# Patient Record
Sex: FEMALE
[De-identification: names, ages, dates, MRNs, and addresses within clinical notes are randomized per-mention and may not be internally consistent; named-entity substitution may affect disease eponyms.]

---

## 2017-07-19 ENCOUNTER — RESULT REVIEW (OUTPATIENT)
Age: 37
End: 2017-07-19

## 2018-03-15 ENCOUNTER — APPOINTMENT (OUTPATIENT)
Dept: OBGYN | Facility: CLINIC | Age: 38
End: 2018-03-15
Payer: COMMERCIAL

## 2018-03-15 VITALS
DIASTOLIC BLOOD PRESSURE: 71 MMHG | HEIGHT: 63 IN | SYSTOLIC BLOOD PRESSURE: 107 MMHG | BODY MASS INDEX: 22.15 KG/M2 | WEIGHT: 125 LBS

## 2018-03-15 DIAGNOSIS — Z34.90 ENCOUNTER FOR SUPERVISION OF NORMAL PREGNANCY, UNSPECIFIED, UNSPECIFIED TRIMESTER: ICD-10-CM

## 2018-03-15 PROCEDURE — 99385 PREV VISIT NEW AGE 18-39: CPT

## 2018-03-15 RX ORDER — AMOXICILLIN AND CLAVULANATE POTASSIUM 875; 125 MG/1; MG/1
875-125 TABLET, COATED ORAL
Qty: 20 | Refills: 0 | Status: COMPLETED | COMMUNITY
Start: 2018-02-28

## 2018-03-15 RX ORDER — HYDROCODONE BITARTRATE AND ACETAMINOPHEN 5; 325 MG/1; MG/1
5-325 TABLET ORAL
Qty: 10 | Refills: 0 | Status: COMPLETED | COMMUNITY
Start: 2018-03-09

## 2018-03-15 RX ORDER — TOBRAMYCIN AND DEXAMETHASONE 3; 1 MG/ML; MG/ML
0.3-0.1 SUSPENSION/ DROPS OPHTHALMIC
Qty: 5 | Refills: 0 | Status: COMPLETED | COMMUNITY
Start: 2017-11-13

## 2018-03-16 LAB — HPV HIGH+LOW RISK DNA PNL CVX: NOT DETECTED

## 2018-03-20 LAB — CYTOLOGY CVX/VAG DOC THIN PREP: NORMAL

## 2018-11-01 ENCOUNTER — APPOINTMENT (OUTPATIENT)
Dept: OBGYN | Facility: CLINIC | Age: 38
End: 2018-11-01
Payer: COMMERCIAL

## 2018-11-01 VITALS
DIASTOLIC BLOOD PRESSURE: 72 MMHG | BODY MASS INDEX: 22.15 KG/M2 | WEIGHT: 125 LBS | HEIGHT: 63 IN | SYSTOLIC BLOOD PRESSURE: 110 MMHG

## 2018-11-01 DIAGNOSIS — N76.4 ABSCESS OF VULVA: ICD-10-CM

## 2018-11-01 PROCEDURE — 99214 OFFICE O/P EST MOD 30 MIN: CPT

## 2019-02-07 ENCOUNTER — APPOINTMENT (OUTPATIENT)
Dept: OBGYN | Facility: CLINIC | Age: 39
End: 2019-02-07
Payer: COMMERCIAL

## 2019-02-07 ENCOUNTER — ASOB RESULT (OUTPATIENT)
Age: 39
End: 2019-02-07

## 2019-02-07 VITALS
HEIGHT: 63 IN | SYSTOLIC BLOOD PRESSURE: 125 MMHG | BODY MASS INDEX: 22.15 KG/M2 | WEIGHT: 125 LBS | DIASTOLIC BLOOD PRESSURE: 84 MMHG

## 2019-02-07 DIAGNOSIS — R14.0 ABDOMINAL DISTENSION (GASEOUS): ICD-10-CM

## 2019-02-07 PROCEDURE — 99214 OFFICE O/P EST MOD 30 MIN: CPT

## 2019-02-07 PROCEDURE — 76830 TRANSVAGINAL US NON-OB: CPT

## 2019-02-07 RX ORDER — CIPROFLOXACIN HYDROCHLORIDE 500 MG/1
500 TABLET, FILM COATED ORAL
Qty: 6 | Refills: 0 | Status: COMPLETED | COMMUNITY
Start: 2018-11-01 | End: 2019-02-07

## 2019-02-07 RX ORDER — AZITHROMYCIN 500 MG/1
500 TABLET, FILM COATED ORAL
Qty: 5 | Refills: 0 | Status: COMPLETED | COMMUNITY
Start: 2019-01-05

## 2019-05-07 ENCOUNTER — APPOINTMENT (OUTPATIENT)
Dept: FAMILY MEDICINE | Facility: CLINIC | Age: 39
End: 2019-05-07
Payer: COMMERCIAL

## 2019-05-07 VITALS
RESPIRATION RATE: 16 BRPM | HEART RATE: 90 BPM | OXYGEN SATURATION: 98 % | HEIGHT: 63 IN | SYSTOLIC BLOOD PRESSURE: 125 MMHG | DIASTOLIC BLOOD PRESSURE: 80 MMHG | WEIGHT: 125 LBS | BODY MASS INDEX: 22.15 KG/M2

## 2019-05-07 DIAGNOSIS — Z85.3 PERSONAL HISTORY OF MALIGNANT NEOPLASM OF BREAST: ICD-10-CM

## 2019-05-07 DIAGNOSIS — Z82.49 FAMILY HISTORY OF ISCHEMIC HEART DISEASE AND OTHER DISEASES OF THE CIRCULATORY SYSTEM: ICD-10-CM

## 2019-05-07 PROCEDURE — 99204 OFFICE O/P NEW MOD 45 MIN: CPT | Mod: 25

## 2019-05-07 PROCEDURE — 36415 COLL VENOUS BLD VENIPUNCTURE: CPT

## 2019-05-07 RX ORDER — ALPRAZOLAM 0.25 MG/1
0.25 TABLET ORAL
Qty: 6 | Refills: 0 | Status: DISCONTINUED | COMMUNITY
Start: 2018-03-01 | End: 2019-05-07

## 2019-05-07 RX ORDER — CIPROFLOXACIN HYDROCHLORIDE 500 MG/1
500 TABLET, FILM COATED ORAL
Qty: 14 | Refills: 0 | Status: DISCONTINUED | COMMUNITY
Start: 2018-05-16 | End: 2019-05-07

## 2019-05-07 NOTE — ASSESSMENT
[FreeTextEntry1] : THYROID\par Patient had a diagnosis of thyroid disorder. Blood was drawn to assess levels of TSH and T4. Patient will continue on current dose of medications at this time unless instructed otherwise. Patient was advised to take thyroid medications on a empty stomach and to wait at least 45 minutes before eating for appropriate absorption.\par \par S/P BREAST CANCER WITH RADIATION/ONC\par patient not feeling right s/p treatment - very tired - not feeling like herself\par \par going to nutritionist wishes to have panel of bw done\par ordered as per RX\par

## 2019-05-07 NOTE — HEALTH RISK ASSESSMENT
[Good] : ~his/her~  mood as  good [] : No [No falls in past year] : Patient reported no falls in the past year [0] : 2) Feeling down, depressed, or hopeless: Not at all (0) [[id number]] : 0 [HIV Test offered] : HIV Test offered [Patient reported mammogram was normal] : Patient reported mammogram was normal [With Significant Other] : lives with significant other [Hepatitis C test offered] : Hepatitis C test offered [Employed] : employed [] :  [# Of Children ___] : has [unfilled] children [Feels Safe at Home] : Feels safe at home [Fully functional (bathing, dressing, toileting, transferring, walking, feeding)] : Fully functional (bathing, dressing, toileting, transferring, walking, feeding) [Fully functional (using the telephone, shopping, preparing meals, housekeeping, doing laundry, using] : Fully functional and needs no help or supervision to perform IADLs (using the telephone, shopping, preparing meals, housekeeping, doing laundry, using transportation, managing medications and managing finances) [Seat Belt] : does not use seat belt [Smoke Detector] : smoke detector [MammogramComments] : breast cancer 2018 [MammogramDate] : 2/2019 [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

## 2019-05-07 NOTE — HISTORY OF PRESENT ILLNESS
[de-identified] : 38 year old female here to establish care. The patients complete medical, family and social history was documented and reviewed with the patient.  The patients medications were identified and also reviewed with the patient as well as any allergies to any medications. All active and previous medical problems were identified and discussed with the patient.  Any new problems were documented. Patient is feeling well today.\par \par Had breast cancer last year, finished radiation and chemo in october 2018 - and is not feeling right\par going to see nutritionist and needs bw

## 2019-05-07 NOTE — PHYSICAL EXAM
[Well Nourished] : well nourished [Regular Rhythm] : with a regular rhythm [Clear to Auscultation] : lungs were clear to auscultation bilaterally [Normal S1, S2] : normal S1 and S2 [Normal Gait] : normal gait [Normal Affect] : the affect was normal [Normal Insight/Judgement] : insight and judgment were intact

## 2019-05-09 ENCOUNTER — MOBILE ON CALL (OUTPATIENT)
Age: 39
End: 2019-05-09

## 2019-10-16 ENCOUNTER — APPOINTMENT (OUTPATIENT)
Dept: FAMILY MEDICINE | Facility: CLINIC | Age: 39
End: 2019-10-16
Payer: COMMERCIAL

## 2019-10-16 VITALS
SYSTOLIC BLOOD PRESSURE: 110 MMHG | HEIGHT: 64 IN | DIASTOLIC BLOOD PRESSURE: 70 MMHG | HEART RATE: 86 BPM | BODY MASS INDEX: 21.51 KG/M2 | WEIGHT: 126 LBS | TEMPERATURE: 98.2 F | OXYGEN SATURATION: 99 %

## 2019-10-16 PROCEDURE — 99213 OFFICE O/P EST LOW 20 MIN: CPT | Mod: 25

## 2019-10-16 PROCEDURE — 36415 COLL VENOUS BLD VENIPUNCTURE: CPT

## 2019-10-16 NOTE — PHYSICAL EXAM
[Normal Sclera/Conjunctiva] : normal sclera/conjunctiva [Normal Oropharynx] : the oropharynx was normal [No Lymphadenopathy] : no lymphadenopathy [Supple] : supple [Thyroid Normal, No Nodules] : the thyroid was normal and there were no nodules present [Normal] : affect was normal and insight and judgment were intact

## 2019-10-16 NOTE — HISTORY OF PRESENT ILLNESS
[FreeTextEntry1] : Here for follow-up of thyroid levels.  [de-identified] : Here for follow-up of thyroid levels. \par Saw oncologist last week. Had CBC and CMP performed, within normal limits.\par \par Needs thyroid medication refill. Synthroid 112 mcg. Brand-name.  Has been on same medication dose for years. \par September back to work, went back to work after being off for 1 year.   \par Saw GI-had colonoscopy. \par \par Medications and allergies reviewed.\par

## 2019-12-04 ENCOUNTER — APPOINTMENT (OUTPATIENT)
Dept: OBGYN | Facility: CLINIC | Age: 39
End: 2019-12-04
Payer: COMMERCIAL

## 2019-12-04 VITALS
HEIGHT: 64 IN | WEIGHT: 125 LBS | BODY MASS INDEX: 21.34 KG/M2 | DIASTOLIC BLOOD PRESSURE: 77 MMHG | SYSTOLIC BLOOD PRESSURE: 115 MMHG

## 2019-12-04 DIAGNOSIS — Z01.419 ENCOUNTER FOR GYNECOLOGICAL EXAMINATION (GENERAL) (ROUTINE) W/OUT ABNORMAL FINDINGS: ICD-10-CM

## 2019-12-04 DIAGNOSIS — R10.2 PELVIC AND PERINEAL PAIN: ICD-10-CM

## 2019-12-04 DIAGNOSIS — N83.299 OTHER OVARIAN CYST, UNSPECIFIED SIDE: ICD-10-CM

## 2019-12-04 PROCEDURE — 99213 OFFICE O/P EST LOW 20 MIN: CPT | Mod: 25

## 2019-12-04 PROCEDURE — 99396 PREV VISIT EST AGE 40-64: CPT

## 2019-12-05 LAB — HPV HIGH+LOW RISK DNA PNL CVX: NOT DETECTED

## 2019-12-09 LAB — CYTOLOGY CVX/VAG DOC THIN PREP: NORMAL

## 2019-12-19 ENCOUNTER — ASOB RESULT (OUTPATIENT)
Age: 39
End: 2019-12-19

## 2019-12-19 ENCOUNTER — APPOINTMENT (OUTPATIENT)
Dept: OBGYN | Facility: CLINIC | Age: 39
End: 2019-12-19
Payer: COMMERCIAL

## 2019-12-19 PROCEDURE — 76830 TRANSVAGINAL US NON-OB: CPT

## 2019-12-20 ENCOUNTER — MESSAGE (OUTPATIENT)
Age: 39
End: 2019-12-20

## 2019-12-25 PROBLEM — R10.2 PELVIC PAIN IN FEMALE: Status: ACTIVE | Noted: 2019-02-07

## 2020-07-14 ENCOUNTER — APPOINTMENT (OUTPATIENT)
Dept: FAMILY MEDICINE | Facility: CLINIC | Age: 40
End: 2020-07-14
Payer: COMMERCIAL

## 2020-07-14 VITALS
WEIGHT: 128 LBS | HEART RATE: 70 BPM | DIASTOLIC BLOOD PRESSURE: 63 MMHG | SYSTOLIC BLOOD PRESSURE: 119 MMHG | RESPIRATION RATE: 16 BRPM | OXYGEN SATURATION: 99 % | BODY MASS INDEX: 21.85 KG/M2 | HEIGHT: 64 IN

## 2020-07-14 DIAGNOSIS — Z00.00 ENCOUNTER FOR GENERAL ADULT MEDICAL EXAMINATION W/OUT ABNORMAL FINDINGS: ICD-10-CM

## 2020-07-14 PROCEDURE — 99396 PREV VISIT EST AGE 40-64: CPT | Mod: 25

## 2020-07-14 PROCEDURE — 36415 COLL VENOUS BLD VENIPUNCTURE: CPT

## 2020-07-14 NOTE — ASSESSMENT
[FreeTextEntry1] : Patient was counseled on healthy eating habits, on daily exercise and stress relief. All medications and allergies were reviewed with the patient and any adjustments necessary were made. Patient was counseled to try engage in an exercise activity for at least 30 minutes 3-4 times a week.  Patient was advised to eat a diet low in fat and carbohydrates and high in protein, with plenty of vegetables. Patient was advised to try and engage in relaxing activities whenever possible.\par The patients blood was draw in office and will be followed and assessed for any issues.  Patient was told to return to the office if any issues arise.  Unless otherwise stated, the patient is to continue all other medications as previously prescribed.\par \par THYROID\par Patient had a diagnosis of thyroid disorder. Blood was drawn to assess levels of TSH and T4. Patient will continue on current dose of medications at this time unless instructed otherwise. Patient was advised to take thyroid medications on a empty stomach and to wait at least 45 minutes before eating for appropriate absorption.\par \par S/P BREAST CANCER WITH RADIATION/ONC\par lumpectomy 2018 -  last chemo/radiation 10/2018\par sees oncologist every six months\par will be on tamoxifen for 10 years\par

## 2020-07-14 NOTE — HEALTH RISK ASSESSMENT
[Very Good] : ~his/her~  mood as very good [No falls in past year] : Patient reported no falls in the past year [] : No [Yes] : Yes [0] : 1) Little interest or pleasure doing things: Not at all (0) [Patient reported mammogram was normal] : Patient reported mammogram was normal [[id number]] : 0 [HIV Test offered] : HIV Test offered [Hepatitis C test offered] : Hepatitis C test offered [With Significant Other] : lives with significant other [Employed] : employed [# Of Children ___] : has [unfilled] children [Feels Safe at Home] : Feels safe at home [] :  [Fully functional (using the telephone, shopping, preparing meals, housekeeping, doing laundry, using] : Fully functional and needs no help or supervision to perform IADLs (using the telephone, shopping, preparing meals, housekeeping, doing laundry, using transportation, managing medications and managing finances) [Fully functional (bathing, dressing, toileting, transferring, walking, feeding)] : Fully functional (bathing, dressing, toileting, transferring, walking, feeding) [Smoke Detector] : smoke detector [Seat Belt] : does not use seat belt [MammogramComments] : breast cancer 2018 - lumpectomy [MammogramDate] : 2/2020 [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

## 2020-07-14 NOTE — HISTORY OF PRESENT ILLNESS
[de-identified] : 39 year old female  here for annual well visit. Patient's blood work was drawn and medications reviewed. Patient's past medical history was reviewed, allergies verified and problems were identified and assessed. Patients medications were reviewed. Patient is feeling well with no new or active complaints at this time.\par \par Had breast cancer last year, finished radiation and chemo in october 2018 - and is not feeling right\par

## 2020-07-26 DIAGNOSIS — Z87.440 PERSONAL HISTORY OF URINARY (TRACT) INFECTIONS: ICD-10-CM

## 2020-11-18 ENCOUNTER — APPOINTMENT (OUTPATIENT)
Dept: FAMILY MEDICINE | Facility: CLINIC | Age: 40
End: 2020-11-18
Payer: COMMERCIAL

## 2020-11-18 DIAGNOSIS — R42 DIZZINESS AND GIDDINESS: ICD-10-CM

## 2020-11-18 PROCEDURE — 99213 OFFICE O/P EST LOW 20 MIN: CPT | Mod: 95

## 2020-11-18 NOTE — ASSESSMENT
[FreeTextEntry1] : dizziness, was first diagnosed with VERTIGO, \par diagnosed by [facility], tried meclizine, did not help\par then went to urgent care, given prednisone\par somewhat better\par advised to complete prednisone, increase meclizine to 25 tid\par neuro\par repeat, tsh\par \par THYROID\par Patient had a diagnosis of thyroid disorder.  Patient will continue on current dose of medications at this time unless instructed otherwise. Patient was advised to take thyroid medications on a empty stomach and to wait at least 45 minutes before eating for appropriate absorption.\par \par S/P BREAST CANCER WITH RADIATION/ONC\par lumpectomy 2018 -  last chemo/radiation 10/2018\par sees oncologist every six months\par will be on tamoxifen for 10 years\par

## 2020-11-18 NOTE — HISTORY OF PRESENT ILLNESS
[Home] : at home, [unfilled] , at the time of the visit. [Medical Office: ([facility])___] : at the medical office located in  [Verbal consent obtained from patient] : the patient, [unfilled] [de-identified] : 40 year old female here with a dizzy spell after steroid injection by ortho into her shoulder.\par Patients active medications, allergies and issues were all reviewed with the patient at time of visit.\par \par Had breast cancer last year, finished radiation and chemo in october 2018 \par

## 2020-11-20 ENCOUNTER — APPOINTMENT (OUTPATIENT)
Dept: NEUROLOGY | Facility: CLINIC | Age: 40
End: 2020-11-20

## 2020-12-21 PROBLEM — Z01.419 ENCOUNTER FOR GYNECOLOGICAL EXAMINATION WITHOUT ABNORMAL FINDING: Status: RESOLVED | Noted: 2018-03-15 | Resolved: 2020-12-21

## 2020-12-23 PROBLEM — Z87.440 HISTORY OF URINARY TRACT INFECTION: Status: RESOLVED | Noted: 2020-07-26 | Resolved: 2020-12-23

## 2021-01-22 ENCOUNTER — APPOINTMENT (OUTPATIENT)
Dept: FAMILY MEDICINE | Facility: CLINIC | Age: 41
End: 2021-01-22

## 2021-03-10 ENCOUNTER — ASOB RESULT (OUTPATIENT)
Age: 41
End: 2021-03-10

## 2021-03-10 ENCOUNTER — APPOINTMENT (OUTPATIENT)
Dept: OBGYN | Facility: CLINIC | Age: 41
End: 2021-03-10
Payer: COMMERCIAL

## 2021-03-10 VITALS
WEIGHT: 127 LBS | DIASTOLIC BLOOD PRESSURE: 63 MMHG | BODY MASS INDEX: 21.68 KG/M2 | HEIGHT: 64 IN | SYSTOLIC BLOOD PRESSURE: 107 MMHG

## 2021-03-10 DIAGNOSIS — N83.201 UNSPECIFIED OVARIAN CYST, RIGHT SIDE: ICD-10-CM

## 2021-03-10 PROCEDURE — 76830 TRANSVAGINAL US NON-OB: CPT

## 2021-03-10 PROCEDURE — 99396 PREV VISIT EST AGE 40-64: CPT

## 2021-03-10 PROCEDURE — 87210 SMEAR WET MOUNT SALINE/INK: CPT | Mod: QW

## 2021-03-10 PROCEDURE — 99072 ADDL SUPL MATRL&STAF TM PHE: CPT

## 2021-03-12 LAB — HPV HIGH+LOW RISK DNA PNL CVX: NOT DETECTED

## 2021-03-13 LAB — CYTOLOGY CVX/VAG DOC THIN PREP: NORMAL

## 2021-03-17 ENCOUNTER — APPOINTMENT (OUTPATIENT)
Dept: OBGYN | Facility: CLINIC | Age: 41
End: 2021-03-17

## 2021-05-11 ENCOUNTER — APPOINTMENT (OUTPATIENT)
Dept: FAMILY MEDICINE | Facility: CLINIC | Age: 41
End: 2021-05-11
Payer: COMMERCIAL

## 2021-05-11 VITALS
HEART RATE: 106 BPM | OXYGEN SATURATION: 98 % | TEMPERATURE: 97.1 F | HEIGHT: 64 IN | SYSTOLIC BLOOD PRESSURE: 110 MMHG | BODY MASS INDEX: 21.85 KG/M2 | DIASTOLIC BLOOD PRESSURE: 70 MMHG | WEIGHT: 128 LBS

## 2021-05-11 DIAGNOSIS — R53.83 OTHER MALAISE: ICD-10-CM

## 2021-05-11 DIAGNOSIS — H81.20 VESTIBULAR NEURONITIS, UNSPECIFIED EAR: ICD-10-CM

## 2021-05-11 DIAGNOSIS — R42 DIZZINESS AND GIDDINESS: ICD-10-CM

## 2021-05-11 DIAGNOSIS — R53.81 OTHER MALAISE: ICD-10-CM

## 2021-05-11 PROCEDURE — 99215 OFFICE O/P EST HI 40 MIN: CPT | Mod: 25

## 2021-05-11 PROCEDURE — 99072 ADDL SUPL MATRL&STAF TM PHE: CPT

## 2021-05-11 RX ORDER — NORTRIPTYLINE HYDROCHLORIDE 10 MG/1
10 CAPSULE ORAL
Qty: 60 | Refills: 0 | Status: ACTIVE | COMMUNITY
Start: 2021-04-13

## 2021-05-11 RX ORDER — NORTRIPTYLINE HYDROCHLORIDE 10 MG/5ML
10 SOLUTION ORAL
Qty: 150 | Refills: 0 | Status: COMPLETED | COMMUNITY
Start: 2021-02-25

## 2021-05-11 RX ORDER — PROCHLORPERAZINE MALEATE 5 MG/1
5 TABLET ORAL
Qty: 14 | Refills: 0 | Status: COMPLETED | COMMUNITY
Start: 2020-11-17

## 2021-05-11 RX ORDER — MECLIZINE HYDROCHLORIDE 25 MG/1
25 TABLET ORAL
Qty: 12 | Refills: 0 | Status: COMPLETED | COMMUNITY
Start: 2020-11-15

## 2021-05-11 RX ORDER — CIPROFLOXACIN HYDROCHLORIDE 500 MG/1
500 TABLET, FILM COATED ORAL
Qty: 10 | Refills: 0 | Status: DISCONTINUED | COMMUNITY
Start: 2020-07-26 | End: 2021-05-11

## 2021-05-11 NOTE — HISTORY OF PRESENT ILLNESS
[de-identified] : 40 year old female here with complaints of episodic dizziness since november. Patients active medications, allergies and issues were all reviewed with the patient at time of visit.\par \par breast cancer, finished radiation and chemo in october 2018 - and is not feeling right\par

## 2021-05-11 NOTE — HEALTH RISK ASSESSMENT
[Yes] : Yes [No falls in past year] : Patient reported no falls in the past year [0] : 2) Feeling down, depressed, or hopeless: Not at all (0) [Very Good] : ~his/her~  mood as very good [Patient reported mammogram was normal] : Patient reported mammogram was normal [HIV Test offered] : HIV Test offered [Hepatitis C test offered] : Hepatitis C test offered [With Significant Other] : lives with significant other [Employed] : employed [] :  [# Of Children ___] : has [unfilled] children [Feels Safe at Home] : Feels safe at home [Fully functional (bathing, dressing, toileting, transferring, walking, feeding)] : Fully functional (bathing, dressing, toileting, transferring, walking, feeding) [Fully functional (using the telephone, shopping, preparing meals, housekeeping, doing laundry, using] : Fully functional and needs no help or supervision to perform IADLs (using the telephone, shopping, preparing meals, housekeeping, doing laundry, using transportation, managing medications and managing finances) [Smoke Detector] : smoke detector [] : No [[id number]] : 0 [Seat Belt] : does not use seat belt [MammogramDate] : 2/2020 [MammogramComments] : breast cancer 2018 - lumpectomy [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

## 2021-05-11 NOTE — ASSESSMENT
[FreeTextEntry1] : vestibular neuritis\par patient seeing jared PT\par still having episodic dizziness, had full work up with neuro, neuro optho\par will try long steroid taper\par questionable 8th cranial nerve neuritis?\par discussed options with patient\par \par patient seeing neurologist\par needs bw done, ordered for patient and will send to pcp\par \par THYROID\par Patient had a diagnosis of thyroid disorder. Blood was drawn to assess levels of TSH and T4. Patient will continue on current dose of medications at this time unless instructed otherwise. Patient was advised to take thyroid medications on a empty stomach and to wait at least 45 minutes before eating for appropriate absorption.\par will recheck\par \par sleep/dizziness\par somewhat helping\par \par 40 min spent total with patient, prior and after working on diagnosis\par \par S/P BREAST CANCER WITH RADIATION/ONC\par lumpectomy 2018 -  last chemo/radiation 10/2018\par sees oncologist every six months\par will be on tamoxifen for 10 years\par

## 2021-05-12 LAB
25(OH)D3 SERPL-MCNC: 33.3 NG/ML
ALBUMIN SERPL ELPH-MCNC: 4.2 G/DL
ALP BLD-CCNC: 48 U/L
ALT SERPL-CCNC: 12 U/L
ANION GAP SERPL CALC-SCNC: 13 MMOL/L
AST SERPL-CCNC: 21 U/L
BASOPHILS # BLD AUTO: 0.05 K/UL
BASOPHILS NFR BLD AUTO: 0.8 %
BILIRUB SERPL-MCNC: 0.2 MG/DL
BUN SERPL-MCNC: 19 MG/DL
CALCIUM SERPL-MCNC: 9.5 MG/DL
CHLORIDE SERPL-SCNC: 104 MMOL/L
CO2 SERPL-SCNC: 22 MMOL/L
CREAT SERPL-MCNC: 0.73 MG/DL
EOSINOPHIL # BLD AUTO: 0.13 K/UL
EOSINOPHIL NFR BLD AUTO: 2 %
FOLATE SERPL-MCNC: 11.1 NG/ML
GLUCOSE SERPL-MCNC: 85 MG/DL
HCT VFR BLD CALC: 36 %
HGB BLD-MCNC: 11.5 G/DL
IMM GRANULOCYTES NFR BLD AUTO: 0 %
IRON SATN MFR SERPL: 18 %
IRON SERPL-MCNC: 78 UG/DL
LYMPHOCYTES # BLD AUTO: 1.9 K/UL
LYMPHOCYTES NFR BLD AUTO: 29.2 %
MAN DIFF?: NORMAL
MCHC RBC-ENTMCNC: 29.2 PG
MCHC RBC-ENTMCNC: 31.9 GM/DL
MCV RBC AUTO: 91.4 FL
MONOCYTES # BLD AUTO: 0.45 K/UL
MONOCYTES NFR BLD AUTO: 6.9 %
NEUTROPHILS # BLD AUTO: 3.97 K/UL
NEUTROPHILS NFR BLD AUTO: 61.1 %
PLATELET # BLD AUTO: 296 K/UL
POTASSIUM SERPL-SCNC: 4.5 MMOL/L
PROT SERPL-MCNC: 6.9 G/DL
RBC # BLD: 3.94 M/UL
RBC # FLD: 12.9 %
SODIUM SERPL-SCNC: 139 MMOL/L
T4 FREE SERPL-MCNC: 1.4 NG/DL
TIBC SERPL-MCNC: 438 UG/DL
TSH SERPL-ACNC: 3.47 UIU/ML
UIBC SERPL-MCNC: 360 UG/DL
VIT B12 SERPL-MCNC: 763 PG/ML
WBC # FLD AUTO: 6.5 K/UL

## 2021-05-17 ENCOUNTER — APPOINTMENT (OUTPATIENT)
Dept: PAIN MANAGEMENT | Facility: CLINIC | Age: 41
End: 2021-05-17

## 2021-07-26 ENCOUNTER — TRANSCRIPTION ENCOUNTER (OUTPATIENT)
Age: 41
End: 2021-07-26

## 2021-11-10 ENCOUNTER — TRANSCRIPTION ENCOUNTER (OUTPATIENT)
Age: 41
End: 2021-11-10

## 2021-12-08 ENCOUNTER — APPOINTMENT (OUTPATIENT)
Dept: FAMILY MEDICINE | Facility: CLINIC | Age: 41
End: 2021-12-08
Payer: COMMERCIAL

## 2021-12-08 VITALS
WEIGHT: 128 LBS | DIASTOLIC BLOOD PRESSURE: 72 MMHG | HEART RATE: 100 BPM | TEMPERATURE: 97.7 F | HEIGHT: 64 IN | OXYGEN SATURATION: 98 % | SYSTOLIC BLOOD PRESSURE: 115 MMHG | BODY MASS INDEX: 21.85 KG/M2

## 2021-12-08 DIAGNOSIS — Z23 ENCOUNTER FOR IMMUNIZATION: ICD-10-CM

## 2021-12-08 DIAGNOSIS — Z11.59 ENCOUNTER FOR SCREENING FOR OTHER VIRAL DISEASES: ICD-10-CM

## 2021-12-08 DIAGNOSIS — R30.0 DYSURIA: ICD-10-CM

## 2021-12-08 PROCEDURE — 99214 OFFICE O/P EST MOD 30 MIN: CPT | Mod: 25

## 2021-12-08 RX ORDER — PREDNISONE 10 MG/1
10 TABLET ORAL
Qty: 41 | Refills: 0 | Status: DISCONTINUED | COMMUNITY
Start: 2021-05-11 | End: 2021-12-08

## 2021-12-08 NOTE — PHYSICAL EXAM
[Coordination Grossly Intact] : coordination grossly intact [Normal Affect] : the affect was normal [Alert and Oriented x3] : oriented to person, place, and time [Normal Insight/Judgement] : insight and judgment were intact

## 2021-12-09 LAB
COVID-19 NUCLEOCAPSID  GAM ANTIBODY INTERPRETATION: NEGATIVE
COVID-19 SPIKE DOMAIN ANTIBODY INTERPRETATION: POSITIVE
SARS-COV-2 AB SERPL IA-ACNC: >250 U/ML
SARS-COV-2 AB SERPL QL IA: 0.09 INDEX
T3FREE SERPL-MCNC: 2.9 PG/ML
T4 FREE SERPL-MCNC: 1.5 NG/DL
TSH SERPL-ACNC: 1.03 UIU/ML

## 2021-12-10 LAB — BACTERIA UR CULT: ABNORMAL

## 2021-12-20 ENCOUNTER — TRANSCRIPTION ENCOUNTER (OUTPATIENT)
Age: 41
End: 2021-12-20

## 2021-12-30 ENCOUNTER — APPOINTMENT (OUTPATIENT)
Dept: FAMILY MEDICINE | Facility: CLINIC | Age: 41
End: 2021-12-30

## 2022-01-17 ENCOUNTER — APPOINTMENT (OUTPATIENT)
Dept: FAMILY MEDICINE | Facility: CLINIC | Age: 42
End: 2022-01-17

## 2022-02-07 ENCOUNTER — APPOINTMENT (OUTPATIENT)
Dept: FAMILY MEDICINE | Facility: CLINIC | Age: 42
End: 2022-02-07
Payer: COMMERCIAL

## 2022-02-07 DIAGNOSIS — R00.2 PALPITATIONS: ICD-10-CM

## 2022-02-07 DIAGNOSIS — Z86.16 PERSONAL HISTORY OF COVID-19: ICD-10-CM

## 2022-02-07 PROCEDURE — 99214 OFFICE O/P EST MOD 30 MIN: CPT | Mod: 95

## 2022-02-07 RX ORDER — MELOXICAM 7.5 MG/1
7.5 TABLET ORAL
Qty: 30 | Refills: 0 | Status: DISCONTINUED | COMMUNITY
Start: 2022-01-27

## 2022-02-07 RX ORDER — BENZONATATE 200 MG/1
200 CAPSULE ORAL
Qty: 20 | Refills: 0 | Status: DISCONTINUED | COMMUNITY
Start: 2022-01-27

## 2022-02-07 RX ORDER — NABUMETONE 500 MG/1
500 TABLET, FILM COATED ORAL
Qty: 10 | Refills: 0 | Status: DISCONTINUED | COMMUNITY
Start: 2021-08-06

## 2022-02-07 NOTE — PLAN
[FreeTextEntry1] : History of COVID-19.  \par Palpitations-had Cardiac workup. Check CBC and TFT test.\par No acute symptoms. \par \par Discussed with Ms. SARA precautions and advised to go to seek immediate medical re-evaluation if condition worsened.  Ms. COLBY RUIZ expressed understanding of the plan.\par

## 2022-02-07 NOTE — HISTORY OF PRESENT ILLNESS
[Other Location: e.g. Home (Enter Location, City,State)___] : at [unfilled] [Verbal consent obtained from patient] : the patient, [unfilled] [Other Location: e.g. School (Enter Location, City,State)___] : at [unfilled], at the time of the visit. [FreeTextEntry8] : Here for follow-up of palpitations.\par \par Dec 20th tested positive COVID; fatigue, dry cough, fever.  Had symptoms for about 5-7 days.\par Started having episodes of palpitations.\par January 15th-Cardiology; did EKG; did Echo, wore heart monitor. Cardiology-said everything was okay. \par Had bloodwork, BMP and Lipids done with Cardio. \par \par End of January had episodes of chest pain. City MD-was evaluated for pleuritis. Given meloxicam and is feeling better.\par Overall feeling improved, back to exercising.  Still getting episodes of palpitations and is concerned.  \par Exercising-fine. State episodes are very brief.\par \par Patient reports she is hydrating well; taking her thyroid medication first thing in the AM.\par No weight changes.  Denies feeling anxious. \par Medications and allergies reviewed.\par \par \par \par \par \par

## 2022-02-07 NOTE — PHYSICAL EXAM
[No Respiratory Distress] : no respiratory distress  [Normal] : affect was normal and insight and judgment were intact [de-identified] : No visible rash

## 2022-02-07 NOTE — REVIEW OF SYSTEMS
[Palpitations] : palpitations [Negative] : Gastrointestinal [Chest Pain] : no chest pain [Headache] : no headache [Dizziness] : no dizziness [Anxiety] : no anxiety

## 2022-04-11 PROBLEM — Z11.59 SCREENING FOR VIRAL DISEASE: Status: ACTIVE | Noted: 2020-07-14

## 2022-04-28 LAB
BASOPHILS # BLD AUTO: 0.05 K/UL
BASOPHILS NFR BLD AUTO: 1 %
EOSINOPHIL # BLD AUTO: 0.15 K/UL
EOSINOPHIL NFR BLD AUTO: 3.1 %
HCT VFR BLD CALC: 37.6 %
HGB BLD-MCNC: 11.8 G/DL
IMM GRANULOCYTES NFR BLD AUTO: 0.2 %
LYMPHOCYTES # BLD AUTO: 1.74 K/UL
LYMPHOCYTES NFR BLD AUTO: 35.7 %
MAN DIFF?: NORMAL
MCHC RBC-ENTMCNC: 29.4 PG
MCHC RBC-ENTMCNC: 31.4 GM/DL
MCV RBC AUTO: 93.8 FL
MONOCYTES # BLD AUTO: 0.4 K/UL
MONOCYTES NFR BLD AUTO: 8.2 %
NEUTROPHILS # BLD AUTO: 2.53 K/UL
NEUTROPHILS NFR BLD AUTO: 51.8 %
PLATELET # BLD AUTO: 288 K/UL
RBC # BLD: 4.01 M/UL
RBC # FLD: 12.5 %
TSH SERPL-ACNC: 2.69 UIU/ML
WBC # FLD AUTO: 4.88 K/UL

## 2022-05-24 ENCOUNTER — NON-APPOINTMENT (OUTPATIENT)
Age: 42
End: 2022-05-24

## 2022-07-07 ENCOUNTER — APPOINTMENT (OUTPATIENT)
Dept: OBGYN | Facility: CLINIC | Age: 42
End: 2022-07-07

## 2022-07-07 VITALS
HEIGHT: 64 IN | WEIGHT: 128 LBS | DIASTOLIC BLOOD PRESSURE: 64 MMHG | BODY MASS INDEX: 21.85 KG/M2 | SYSTOLIC BLOOD PRESSURE: 101 MMHG

## 2022-07-07 PROCEDURE — 99396 PREV VISIT EST AGE 40-64: CPT

## 2022-07-08 LAB — HPV HIGH+LOW RISK DNA PNL CVX: NOT DETECTED

## 2022-07-12 LAB — CYTOLOGY CVX/VAG DOC THIN PREP: NORMAL

## 2022-07-14 ENCOUNTER — ASOB RESULT (OUTPATIENT)
Age: 42
End: 2022-07-14

## 2022-07-14 ENCOUNTER — APPOINTMENT (OUTPATIENT)
Dept: OBGYN | Facility: CLINIC | Age: 42
End: 2022-07-14

## 2022-07-14 ENCOUNTER — NON-APPOINTMENT (OUTPATIENT)
Age: 42
End: 2022-07-14

## 2022-07-14 PROCEDURE — 76830 TRANSVAGINAL US NON-OB: CPT

## 2022-07-25 ENCOUNTER — NON-APPOINTMENT (OUTPATIENT)
Age: 42
End: 2022-07-25

## 2022-10-07 ENCOUNTER — OUTPATIENT (OUTPATIENT)
Dept: OUTPATIENT SERVICES | Facility: HOSPITAL | Age: 42
LOS: 1 days | Discharge: HOME | End: 2022-10-07

## 2022-10-07 DIAGNOSIS — C50.919 MALIGNANT NEOPLASM OF UNSPECIFIED SITE OF UNSPECIFIED FEMALE BREAST: ICD-10-CM

## 2022-10-11 ENCOUNTER — APPOINTMENT (OUTPATIENT)
Dept: HEMATOLOGY ONCOLOGY | Facility: CLINIC | Age: 42
End: 2022-10-11

## 2022-10-11 ENCOUNTER — RESULT REVIEW (OUTPATIENT)
Age: 42
End: 2022-10-11

## 2022-10-11 ENCOUNTER — NON-APPOINTMENT (OUTPATIENT)
Age: 42
End: 2022-10-11

## 2022-10-11 VITALS
TEMPERATURE: 97.7 F | DIASTOLIC BLOOD PRESSURE: 85 MMHG | OXYGEN SATURATION: 98 % | SYSTOLIC BLOOD PRESSURE: 121 MMHG | RESPIRATION RATE: 16 BRPM | HEART RATE: 78 BPM | BODY MASS INDEX: 21.64 KG/M2 | HEIGHT: 64.02 IN | WEIGHT: 126.77 LBS

## 2022-10-11 DIAGNOSIS — Z87.891 PERSONAL HISTORY OF NICOTINE DEPENDENCE: ICD-10-CM

## 2022-10-11 DIAGNOSIS — Z80.49 FAMILY HISTORY OF MALIGNANT NEOPLASM OF OTHER GENITAL ORGANS: ICD-10-CM

## 2022-10-11 LAB
BASOPHILS # BLD AUTO: 0.03 K/UL — SIGNIFICANT CHANGE UP (ref 0–0.2)
BASOPHILS NFR BLD AUTO: 0.5 % — SIGNIFICANT CHANGE UP (ref 0–2)
EOSINOPHIL # BLD AUTO: 0.12 K/UL — SIGNIFICANT CHANGE UP (ref 0–0.5)
EOSINOPHIL NFR BLD AUTO: 1.8 % — SIGNIFICANT CHANGE UP (ref 0–6)
HCT VFR BLD CALC: 38.8 % — SIGNIFICANT CHANGE UP (ref 34.5–45)
HGB BLD-MCNC: 12.8 G/DL — SIGNIFICANT CHANGE UP (ref 11.5–15.5)
IMM GRANULOCYTES NFR BLD AUTO: 0.3 % — SIGNIFICANT CHANGE UP (ref 0–0.9)
LYMPHOCYTES # BLD AUTO: 2.06 K/UL — SIGNIFICANT CHANGE UP (ref 1–3.3)
LYMPHOCYTES # BLD AUTO: 31.1 % — SIGNIFICANT CHANGE UP (ref 13–44)
MCHC RBC-ENTMCNC: 29.8 PG — SIGNIFICANT CHANGE UP (ref 27–34)
MCHC RBC-ENTMCNC: 33 G/DL — SIGNIFICANT CHANGE UP (ref 32–36)
MCV RBC AUTO: 90.4 FL — SIGNIFICANT CHANGE UP (ref 80–100)
MONOCYTES # BLD AUTO: 0.39 K/UL — SIGNIFICANT CHANGE UP (ref 0–0.9)
MONOCYTES NFR BLD AUTO: 5.9 % — SIGNIFICANT CHANGE UP (ref 2–14)
NEUTROPHILS # BLD AUTO: 4.01 K/UL — SIGNIFICANT CHANGE UP (ref 1.8–7.4)
NEUTROPHILS NFR BLD AUTO: 60.4 % — SIGNIFICANT CHANGE UP (ref 43–77)
NRBC # BLD: 0 /100 WBCS — SIGNIFICANT CHANGE UP (ref 0–0)
PLATELET # BLD AUTO: 294 K/UL — SIGNIFICANT CHANGE UP (ref 150–400)
RBC # BLD: 4.29 M/UL — SIGNIFICANT CHANGE UP (ref 3.8–5.2)
RBC # FLD: 12.6 % — SIGNIFICANT CHANGE UP (ref 10.3–14.5)
WBC # BLD: 6.63 K/UL — SIGNIFICANT CHANGE UP (ref 3.8–10.5)
WBC # FLD AUTO: 6.63 K/UL — SIGNIFICANT CHANGE UP (ref 3.8–10.5)

## 2022-10-11 PROCEDURE — 99205 OFFICE O/P NEW HI 60 MIN: CPT

## 2022-10-11 RX ORDER — BUTALBITAL, ACETAMINOPHEN AND CAFFEINE 325; 50; 40 MG/1; MG/1; MG/1
50-325-40 TABLET ORAL
Qty: 30 | Refills: 0 | Status: DISCONTINUED | COMMUNITY
Start: 2022-06-21 | End: 2022-10-11

## 2022-10-11 RX ORDER — TAMOXIFEN CITRATE 20 MG/1
20 TABLET, FILM COATED ORAL
Qty: 30 | Refills: 0 | Status: DISCONTINUED | COMMUNITY
Start: 2018-10-01 | End: 2022-10-11

## 2022-10-12 ENCOUNTER — RESULT REVIEW (OUTPATIENT)
Age: 42
End: 2022-10-12

## 2022-10-12 PROCEDURE — 88321 CONSLTJ&REPRT SLD PREP ELSWR: CPT

## 2022-10-13 LAB
ALBUMIN SERPL ELPH-MCNC: 4.7 G/DL
ALP BLD-CCNC: 47 U/L
ALT SERPL-CCNC: 12 U/L
ANION GAP SERPL CALC-SCNC: 14 MMOL/L
AST SERPL-CCNC: 20 U/L
BILIRUB SERPL-MCNC: <0.2 MG/DL
BUN SERPL-MCNC: 14 MG/DL
CALCIUM SERPL-MCNC: 9.8 MG/DL
CHLORIDE SERPL-SCNC: 105 MMOL/L
CO2 SERPL-SCNC: 22 MMOL/L
CREAT SERPL-MCNC: 0.69 MG/DL
EGFR: 111 ML/MIN/1.73M2
GLUCOSE SERPL-MCNC: 92 MG/DL
HBV SURFACE AB SER QL: REACTIVE
HBV SURFACE AG SER QL: NONREACTIVE
HCV AB SER QL: NONREACTIVE
HCV S/CO RATIO: 0.18 S/CO
INR PPP: 0.89 RATIO
NPP-PT: NORMAL SEC
POTASSIUM SERPL-SCNC: 4.4 MMOL/L
PROT SERPL-MCNC: 7.9 G/DL
PROTHROMBIN TIME/NOMAL: NORMAL
PT BLD: 10.4 SEC
PT BLD: 10.4 SEC
SODIUM SERPL-SCNC: 141 MMOL/L
TSH SERPL-ACNC: 2.32 UIU/ML

## 2022-10-13 NOTE — PHYSICAL EXAM
[Fully active, able to carry on all pre-disease performance without restriction] : Status 0 - Fully active, able to carry on all pre-disease performance without restriction [Normal] : no peripheral adenopathy appreciated [de-identified] : R s/p LE with well healing scar, no nipple retraction skin dimpling  or palp masses. L s/p LE with well healed scar, no nipple retraction skin dimpling or palp masses. B/L ax neg

## 2022-10-13 NOTE — ASSESSMENT
[FreeTextEntry1] : 41 year old woman with PMH of hypothyroidism, migraine, and L breast cancer s/p lumpectomy and CMF/RT now on tamoxifen presenting for initial evaluation of R sided breast cancer after she felt lump. She is now s/p R lumpectomy with pathology results revealing invasive ductal carcinoma, ER/RI positive and HER2 negative. Stage T2A, N0,Mx; AJCC stage 1B. We discussed imaging and pathology results.\par \par We discussed not performing oncotypeDX given her R sided malignancy was able to develop despite taking tamoxifen, thus would not give us any information that would affect our decision-making as to whether or not to add adjuvant chemo as it grew on white an dis less likely to be hormonally sensitive. e discussed treatment with adjuvant chemotherapy, CMF vs TC. We reviewed the adverse effects from CMF, including hematologic abnormalities, mucositis, and hair thinning, and TC which included hair loss (and 3-4 % not fully recovering all their hair back) , anemia, thrombocytopenia, neutropenia, febrile neutropenia, and neuropathy amongst others. The option of hair preservation with scaplp cooling was also discussed. We discussed various data sets that suggest TC was somewhat to CMF in event free and overall survival, but that was with predom CMF given every 3 weeks, and not every 2 weeks (with neulasta) ie dose dense which we would recommend.\par \par After chemo would recommend adjuvant XRT to decrease the risk of local recurrence.\par \par Thereafter would recommend adjuvant anti-estrogen therapy with Lupron to render her menopausal (vs eventual BSO) combined with an aromatase inhibitor such as anastrozole for optimal outcome per data from the SOFT study and in light of the new cancer developing on tamoxifen. Have reviewed their potential risks benefits and side effects. \par \par The patient inquired about the possibility of administering chemotherapy via PIV, but was recommended to have port placed given fair peripheral veins. Will check CBC, CMP, hep panel, and coags today. Pt will think about our discussion today and make a decision on whether to pursue chemotherapy. She was provided with material on the regimens I mentioned.\par \par We have answered her questions and offered any further help with treatment recommendations as the needs should arise.

## 2022-10-13 NOTE — RESULTS/DATA
[FreeTextEntry1] : Mammogram 09/2022\par Impression:\par Indeterminate asymmetry with distortion in the superior central R breast, without sonographic correlate\par No suspicious mammographic or sonographic findings at sites of palpable concern in the inferior R breast. Management of any palpable abnormality should be determined clinically.\par \par MRI 09/15/22\par Impression:\par Malignant R breast mass in the posterior upper inner quadrant with satellite lesion in the upper outer quadrant, together span 3.5cm in width\par Subtle indeterminate nonmass enhancement at 12:00 mid depth\par No suspicious findings on the L\par BIRADS 4\par \par Surgical Pathology Report 9/26/022\par Diagnosis\par 1. [place] node #1 R axilla\par Total examined: 2\par no carcinoma preset\par 2. Breast R superior margin: Benign breast parenchyma with fat necrosis\par 3. Breast, R lateral Margin\par Benign breast parenchyma\par 4. Breast R inferior Margin\par Histological tumor type: Invasive mammary carcinoma\par Tumor Size: microscopic measurement 1mm\par DSIC: not identified\par Calcification: in benign epithelium\par Lymphovascular invasion: not identified\par Surgical margins: no involvement of the surgical margins by invasive carcinoma\par 5. R medial margin: Benign breast parenchyma\par 6.R anterior margin: Benign breast parenchyma\par 7. R posterior margin: Atypical lobular hyperplasia\par 8. Breast, Right Lumpectomy:\par Anatomic Site, Laterality, Procedure:\par Breast, right, excision/lumpectomy\par Histological Tumor Type(s):\par Invasive mammary carciroma\par With predominant lobular growth pattern\par Focality: Single focus\par Tumor Size:\par Microscopic measurement: 29 mm\par Invasive carcinoma is present in B consecutive tissue slices\par Histologic Grade: Ill/llI: minimal or no tubule formation (< 10% of tumor) [score 3]\par Nuclear grade: IV/Ill (moderate variation in size and shape) [score 2]\par Mitotic Count: <= 8 mitoses per 10. high power fields [score 11\par Overall Tumor Grade:  \par Combined score: grade |I/III (moderately differentiated) (score ran\par Ductal Carcinoma in Situ (DCIS):Identified; Focal\par DCIS, Size:\par Total number of slides with DCIS: 1\par Total number of slides examined: 16\par DCIS, Architectural Pattern(s):Solid \par Calcification: In benign epithelium\par Lymphovascular Invasion:Identified\par Perineural Invasion:Not identified\par Treatment Effect: No known presurgical therapy\par Surgical Margins (Invasive and/or Microinvasíve Carcinoma):\par For final margin status see separately submitted margins Note: Precise measurement of margin clearance is not possible\par in separately submitted specimens with no residual invasive carcinoma.\par Surgical Margins (DCIS):\par For final margin status see separately submitted margins Note: Precise measurement of margin clearance is not possible\par in separately submitted specimens with no residual. DCIS.\par Breast parenchyma (additional findings):\par Biopsy site changes\par Fibrocystic changes\par Lymph Nodes:See separately submitted lymph nodes\par Results) of Immunohistochemical Stain(s):\par Previously reported [id number]\par Pathologic Stage Classification (AJCC 8th Edition)\par PRIMARY TUMOR (pT):pT2: Tumor >20 mm but <=50 mm in greatest dimension\par REGIONAL LYMPH NODES (pN):pNO:\par No regional lymph node metastasis identified\par

## 2022-10-13 NOTE — HISTORY OF PRESENT ILLNESS
[Disease: _____________________] : Disease: [unfilled] [T: ___] : T[unfilled] [N: ___] : N[unfilled] [M: ___] : M[unfilled] [AJCC Stage: ____] : AJCC Stage: [unfilled] [de-identified] : 41 year old woman with PMH of hypothyroidism, migraine, and L breast cancer s/p lumpectomy and CMF/RT now on tamoxifen presenting for initial evaluation of R sided breast cancer.\par \par Pt states in 2018 she had an abnormal mammogram found to have moderately differentiated invasive ductal carcinoma on core biopsy. Pt then underwent L lumpectomy for 1.2 cm mammary carcinoma, with 1/2 sentinel LN examined showing micrometastases. ER/VT+ and HER2 negative. Her Oncotype score was 28, thus there was a decision to undergo chemotherapy. Pt underwent CMF and RT. She states given severe neutropenia that she developed, she was given Neulasta with every cycle after cycle first.  . Once completed, she was started on Tamoxifen.\par \par She continued to have mammograms with no major abnormalities, her last mammogram being in April 2022. She was also in good health. In August 2022 pt states she felt a lump in her R breast. She underwent mammogram and U/S which showed an indeterminate asymmetrical distortion in R central superior breast, BIRADS 4. Biopsy was recommended and performed which showed invasive mammary carcinoma. Pt underwent lumpectomy, with pathology findings consistent with 0/2 SLN with cancer; Invasive mammary carcinoma, predom of "lobular growth patter', measuring 29 mm. ER/VT positive, HER2 negative. \par \par Pt states she has been taking her tamoxifen as prescribed with no issues. She endorses hot flashes and arthralgia, but controlled. \par \par She denies fevers, SOB, HA, N/V/C/D, abdominal pain, CP, dysuria, weakness, or pain from her recent lumpectomy. \par \par BRCA1/2 was not detected of note. 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  [de-identified] : Invasive ductal carcinoma [de-identified] : ER+ NE+ Her 2 yasmine -

## 2022-10-14 LAB — SURGICAL PATHOLOGY STUDY: SIGNIFICANT CHANGE UP

## 2022-12-30 ENCOUNTER — NON-APPOINTMENT (OUTPATIENT)
Age: 42
End: 2022-12-30

## 2023-01-23 ENCOUNTER — APPOINTMENT (OUTPATIENT)
Dept: FAMILY MEDICINE | Facility: CLINIC | Age: 43
End: 2023-01-23
Payer: COMMERCIAL

## 2023-01-23 DIAGNOSIS — C50.919 MALIGNANT NEOPLASM OF UNSPECIFIED SITE OF UNSPECIFIED FEMALE BREAST: ICD-10-CM

## 2023-01-23 DIAGNOSIS — R09.82 POSTNASAL DRIP: ICD-10-CM

## 2023-01-23 PROCEDURE — 99214 OFFICE O/P EST MOD 30 MIN: CPT | Mod: 95

## 2023-01-23 RX ORDER — EXEMESTANE 25 MG/1
25 TABLET, FILM COATED ORAL
Refills: 0 | Status: ACTIVE | COMMUNITY
Start: 2023-01-23

## 2023-01-23 RX ORDER — LEUPROLIDE ACETATE 3.75 MG
3.75 KIT INTRAMUSCULAR
Refills: 0 | Status: ACTIVE | COMMUNITY
Start: 2023-01-23

## 2023-01-23 NOTE — REVIEW OF SYSTEMS
[Fatigue] : fatigue [Postnasal Drip] : postnasal drip [Negative] : Genitourinary [Fever] : no fever [Chills] : no chills [Earache] : no earache [Chest Pain] : no chest pain [Palpitations] : no palpitations [Shortness Of Breath] : no shortness of breath [Wheezing] : no wheezing [Nausea] : no nausea [Diarrhea] : diarrhea [FreeTextEntry2] : Had the flu-January 1st;  [FreeTextEntry4] : throat irritation  [FreeTextEntry6] : Lingering cough and mucus.

## 2023-01-23 NOTE — HISTORY OF PRESENT ILLNESS
[Other Location: e.g. School (Enter Location, City,State)___] : at [unfilled], at the time of the visit. [Medical Office: ([facility])___] : at the medical office located in  [Verbal consent obtained from patient] : the patient, [unfilled] [FreeTextEntry1] : Here for follow-up; needs med refills.\par  [de-identified] : Here for follow-up; needs med refills.\par Following with [facility], Dr. Quijano- for recurrence of breast cancer.  Has upcoming appointment with them. \par \par Now on Lupron and Exemestane.  No more tamoxifen. \par Diagnosed again in Sept 2022. \par \par Sleeping at 10PM; interrupted sleep. \par Feeling tired. \par -Side effects of the chemo medications. \par \par Had flu New Years Jovanna-went to ; too late for Tamiflu.\par Has an ongoing post-nasal drip and sore throat, non-productive cough.\par Was taking flonase but felt like it made her heart race.\par \par Had seen Cardiology last year for palpitations-had negative work-up. \par \par Medications and allergies reviewed.\par

## 2023-01-23 NOTE — PLAN
[FreeTextEntry1] : Hypothyroid-had sonogram 7/22; refill medications-level checked in October. \par Due for follow-up in office. \par \par Breast Ca-following with [facility]-asked patient to have notes/recent labs sent to us. \par \par Hx of influenza-post nasal drip-sore throat. \par Encouraged patient to drink plenty of fluids, rest, and take supportive care measures.  Discussed use of saline nasal spray for relief of nasal congestion.  Salt water gargles for throat. \par Patient advised to call office if symptoms do not improve.  If ongoing cough-needs re-evaluation in office. Ms. RUIZ expressed understanding.\par

## 2023-01-23 NOTE — PHYSICAL EXAM
[Normal] : affect was normal and insight and judgment were intact [de-identified] : No visible respiratory distress [de-identified] : No visible rash

## 2023-02-06 ENCOUNTER — APPOINTMENT (OUTPATIENT)
Dept: ORTHOPEDIC SURGERY | Facility: CLINIC | Age: 43
End: 2023-02-06
Payer: COMMERCIAL

## 2023-02-06 VITALS — HEIGHT: 64 IN | BODY MASS INDEX: 21 KG/M2 | WEIGHT: 123 LBS

## 2023-02-06 DIAGNOSIS — S80.02XA CONTUSION OF LEFT KNEE, INITIAL ENCOUNTER: ICD-10-CM

## 2023-02-06 DIAGNOSIS — S16.1XXA STRAIN OF MUSCLE, FASCIA AND TENDON AT NECK LEVEL, INITIAL ENCOUNTER: ICD-10-CM

## 2023-02-06 DIAGNOSIS — Z86.39 PERSONAL HISTORY OF OTHER ENDOCRINE, NUTRITIONAL AND METABOLIC DISEASE: ICD-10-CM

## 2023-02-06 PROCEDURE — 99204 OFFICE O/P NEW MOD 45 MIN: CPT

## 2023-02-06 PROCEDURE — 72040 X-RAY EXAM NECK SPINE 2-3 VW: CPT

## 2023-02-06 PROCEDURE — 73564 X-RAY EXAM KNEE 4 OR MORE: CPT | Mod: LT

## 2023-02-06 RX ORDER — CYCLOBENZAPRINE HYDROCHLORIDE 5 MG/1
5 TABLET, FILM COATED ORAL
Qty: 30 | Refills: 0 | Status: COMPLETED | COMMUNITY
Start: 2023-02-06 | End: 2023-03-08

## 2023-02-06 RX ORDER — MELOXICAM 15 MG/1
15 TABLET ORAL DAILY
Qty: 30 | Refills: 0 | Status: COMPLETED | COMMUNITY
Start: 2023-02-06 | End: 2023-03-08

## 2023-02-06 NOTE — PHYSICAL EXAM
[Left] : left knee [] : no erythema [FreeTextEntry3] : Superficial abrasion with surrounding swelling anterior knee. Clean, no signs of infection.  [TWNoteComboBox7] : flexion 100 degrees [de-identified] : extension 0 degrees

## 2023-02-06 NOTE — REASON FOR VISIT
[FreeTextEntry2] : New Patient- 41yo female with left knee and neck pain. She slipped and fell on ice this morning. Denies radiating arm pain, numbness and tingling.

## 2023-02-06 NOTE — DISCUSSION/SUMMARY
[Medication Risks Reviewed] : Medication risks reviewed [de-identified] : General Dx discussion\par The patient was advised of the diagnosis. The natural history of the pathology was explained in full to the patient in layman's terms. All questions were answered. The risks and benefits of surgical and non-surgical treatment alternatives were explained in full to the patient. \par \par Case discussed.\par Wound cleaned and dressed.\par Proper wound care discussed.\par Advised of signs and symptoms of infection and instructed to follow up  immediately if they develop.. \par Will start mobic and flexeril as needed. \par Follow up in 2 weeks. \par \par Entered by ADRIAN Putnam acting as scribe.\par -\par The documentation recorded by the scribe accurately reflects the service I personally performed and the decisions made by me.\par \par

## 2023-02-06 NOTE — HISTORY OF PRESENT ILLNESS
[10] : 10 [0] : 0 [Sharp] : sharp [Tightness] : tightness [Frequent] : frequent [Leisure] : leisure [Full time] : Work status: full time [de-identified] : Left knee and neck pain since she slipped and fell on ice this morning. [] : no [FreeTextEntry1] : left knee and neck [FreeTextEntry7] : up leg [FreeTextEntry9] : Advil [de-identified] : pressure

## 2023-02-06 NOTE — IMAGING
[No bony abnormalities] : No bony abnormalities [Left] : left knee [All Views] : anteroposterior, lateral, skyline, and anteroposterior standing [There are no fractures, subluxations or dislocations. No significant abnormalities are seen] : There are no fractures, subluxations or dislocations. No significant abnormalities are seen

## 2023-02-09 ENCOUNTER — APPOINTMENT (OUTPATIENT)
Dept: FAMILY MEDICINE | Facility: CLINIC | Age: 43
End: 2023-02-09
Payer: COMMERCIAL

## 2023-02-09 DIAGNOSIS — M25.562 PAIN IN LEFT KNEE: ICD-10-CM

## 2023-02-09 PROCEDURE — 99213 OFFICE O/P EST LOW 20 MIN: CPT | Mod: 95

## 2023-02-23 ENCOUNTER — APPOINTMENT (OUTPATIENT)
Dept: ORTHOPEDIC SURGERY | Facility: CLINIC | Age: 43
End: 2023-02-23
Payer: COMMERCIAL

## 2023-02-23 VITALS — HEIGHT: 64 IN | WEIGHT: 123 LBS | BODY MASS INDEX: 21 KG/M2

## 2023-02-23 VITALS — WEIGHT: 123 LBS | BODY MASS INDEX: 21 KG/M2 | HEIGHT: 64 IN

## 2023-02-23 DIAGNOSIS — S16.1XXD STRAIN OF MUSCLE, FASCIA AND TENDON AT NECK LEVEL, SUBSEQUENT ENCOUNTER: ICD-10-CM

## 2023-02-23 PROCEDURE — 99213 OFFICE O/P EST LOW 20 MIN: CPT

## 2023-02-23 NOTE — PHYSICAL EXAM
[Left] : left knee [] : no lateral joint line tenderness [FreeTextEntry3] : healing abrasion. no signs of infection.  [TWNoteComboBox7] : flexion 100 degrees [de-identified] : extension 0 degrees

## 2023-02-23 NOTE — DISCUSSION/SUMMARY
[Medication Risks Reviewed] : Medication risks reviewed [de-identified] : General Dx discussion\par The patient was advised of the diagnosis. The natural history of the pathology was explained in full to the patient in layman's terms. All questions were answered. The risks and benefits of surgical and non-surgical treatment alternatives were explained in full to the patient. \par \par Case discussed.\par May do low impact activities. \par Continue Advil as needed. \par f/u 4-5 weeks. \par \par \par Entered by ADRIAN Redding acting as scribe.\par -\par The documentation recorded by the scribe accurately reflects the service I personally performed and the decisions made by me.\par \par

## 2023-02-23 NOTE — HISTORY OF PRESENT ILLNESS
[7] : 7 [0] : 0 [Sharp] : sharp [Tightness] : tightness [Frequent] : frequent [de-identified] : Patient is here for a follow up on her left knee and neck. She is feeling 75% better. She did not take mobic or flexeril. Taking advil as needed. \par  [] : no [FreeTextEntry1] : Left knee and neck [FreeTextEntry9] : Advil [de-identified] : pressure

## 2023-03-08 ENCOUNTER — FORM ENCOUNTER (OUTPATIENT)
Age: 43
End: 2023-03-08

## 2023-03-09 ENCOUNTER — APPOINTMENT (OUTPATIENT)
Dept: MRI IMAGING | Facility: CLINIC | Age: 43
End: 2023-03-09
Payer: COMMERCIAL

## 2023-03-09 ENCOUNTER — TRANSCRIPTION ENCOUNTER (OUTPATIENT)
Age: 43
End: 2023-03-09

## 2023-03-09 PROCEDURE — 73721 MRI JNT OF LWR EXTRE W/O DYE: CPT | Mod: LT

## 2023-03-11 ENCOUNTER — NON-APPOINTMENT (OUTPATIENT)
Age: 43
End: 2023-03-11

## 2023-03-23 ENCOUNTER — APPOINTMENT (OUTPATIENT)
Dept: ORTHOPEDIC SURGERY | Facility: CLINIC | Age: 43
End: 2023-03-23
Payer: COMMERCIAL

## 2023-03-23 VITALS — HEIGHT: 64 IN | BODY MASS INDEX: 21 KG/M2 | WEIGHT: 123 LBS

## 2023-03-23 PROCEDURE — 99213 OFFICE O/P EST LOW 20 MIN: CPT

## 2023-03-23 NOTE — PHYSICAL EXAM
[Left] : left knee [] : patient ambulates without assistive device [FreeTextEntry3] : healing abrasion. no signs of infection.  [TWNoteComboBox7] : flexion 120 degrees [de-identified] : extension 0 degrees

## 2023-03-23 NOTE — HISTORY OF PRESENT ILLNESS
[5] : 5 [3] : 3 [Dull/Aching] : dull/aching [] : Post Surgical Visit: no [FreeTextEntry1] : left knee

## 2023-03-23 NOTE — DISCUSSION/SUMMARY
[Medication Risks Reviewed] : Medication risks reviewed [de-identified] : General Dx discussion\par The patient was advised of the diagnosis. The natural history of the pathology was explained in full to the patient in layman's terms. All questions were answered. The risks and benefits of surgical and non-surgical treatment alternatives were explained in full to the patient. \par \par Overall feels 75-80% better \par mri reviewed

## 2023-05-04 ENCOUNTER — APPOINTMENT (OUTPATIENT)
Dept: ORTHOPEDIC SURGERY | Facility: CLINIC | Age: 43
End: 2023-05-04
Payer: COMMERCIAL

## 2023-05-04 VITALS — WEIGHT: 123 LBS | BODY MASS INDEX: 21 KG/M2 | HEIGHT: 64 IN

## 2023-05-04 DIAGNOSIS — S80.02XD CONTUSION OF LEFT KNEE, SUBSEQUENT ENCOUNTER: ICD-10-CM

## 2023-05-04 PROCEDURE — 99213 OFFICE O/P EST LOW 20 MIN: CPT

## 2023-05-04 NOTE — HISTORY OF PRESENT ILLNESS
[4] : 4 [2] : 2 [de-identified] : Patient is here for a follow up on her left knee. [FreeTextEntry1] : Left knee

## 2023-05-04 NOTE — PHYSICAL EXAM
[Left] : left knee [5___] : hamstring 5[unfilled]/5 [Negative] : negative Yazan's [] : no lateral joint line tenderness [FreeTextEntry3] : healing abrasion. no signs of infection.  [TWNoteComboBox7] : flexion 130 degrees [de-identified] : extension 0 degrees

## 2023-05-04 NOTE — DISCUSSION/SUMMARY
[Medication Risks Reviewed] : Medication risks reviewed [de-identified] : General Dx discussion\par The patient was advised of the diagnosis. The natural history of the pathology was explained in full to the patient in layman's terms. All questions were answered. The risks and benefits of surgical and non-surgical treatment alternatives were explained in full to the patient. \par \par doing well \par f/u prn \par will try glucos/chond

## 2023-05-09 ENCOUNTER — APPOINTMENT (OUTPATIENT)
Dept: SURGERY | Facility: CLINIC | Age: 43
End: 2023-05-09
Payer: COMMERCIAL

## 2023-05-09 ENCOUNTER — NON-APPOINTMENT (OUTPATIENT)
Age: 43
End: 2023-05-09

## 2023-05-09 VITALS
OXYGEN SATURATION: 98 % | TEMPERATURE: 97.3 F | BODY MASS INDEX: 21.34 KG/M2 | DIASTOLIC BLOOD PRESSURE: 80 MMHG | SYSTOLIC BLOOD PRESSURE: 120 MMHG | HEART RATE: 92 BPM | HEIGHT: 64 IN | WEIGHT: 125 LBS

## 2023-05-09 DIAGNOSIS — E03.9 HYPOTHYROIDISM, UNSPECIFIED: ICD-10-CM

## 2023-05-09 DIAGNOSIS — E04.2 NONTOXIC MULTINODULAR GOITER: ICD-10-CM

## 2023-05-09 DIAGNOSIS — Z86.69 PERSONAL HISTORY OF OTHER DISEASES OF THE NERVOUS SYSTEM AND SENSE ORGANS: ICD-10-CM

## 2023-05-09 DIAGNOSIS — E06.3 AUTOIMMUNE THYROIDITIS: ICD-10-CM

## 2023-05-09 PROCEDURE — 99203 OFFICE O/P NEW LOW 30 MIN: CPT

## 2023-05-20 PROBLEM — E06.3 HASHIMOTO'S THYROIDITIS: Status: ACTIVE | Noted: 2023-05-20

## 2023-05-20 PROBLEM — E04.2 MULTINODULAR GOITER: Status: ACTIVE | Noted: 2023-05-09

## 2023-05-20 PROBLEM — Z86.69 HISTORY OF MIGRAINE: Status: RESOLVED | Noted: 2023-05-20 | Resolved: 2023-05-20

## 2023-05-20 NOTE — CONSULT LETTER
[Dear  ___] : Dear  [unfilled], [Consult Letter:] : I had the pleasure of evaluating your patient, [unfilled]. [Please see my note below.] : Please see my note below. [Sincerely,] : Sincerely, [Consult Closing:] : Thank you very much for allowing me to participate in the care of this patient.  If you have any questions, please do not hesitate to contact me. [FreeTextEntry2] : Dr. Shital Wilkinson [FreeTextEntry3] : Miranda Carrion MD, FACS\par Assistant Professor of Surgery and Otolaryngology\par [facility]\par

## 2023-05-20 NOTE — HISTORY OF PRESENT ILLNESS
[de-identified] : Patient referred by Dr. Wilkinson for evaluation of multinodular goiter.  Patient reports 12-year history of hypothyroidism with thyroid nodules followed over the last 3 years.  She reports her thyroid is "off".  Reports occasional palpitations.  Denies dysphagia or change in voice.  Patient received radiation for bilateral breast cancer in 2018 and 2022.  Thyroid ultrasound May 2023: Right lobe 2.7 x 1.2 x 0.8 cm with mid cystic nodule 9 mm.  Left lobe 2.8 x 1 x 0.9 cm, no nodules noted.  Blood work April 2023: Calcium 10.2, TSH 0.15, free T4 1.6, peroxidase antibody 497.  I have reviewed all old and new data and available images.

## 2023-05-20 NOTE — REASON FOR VISIT
[Initial Consultation] : an initial consultation for [FreeTextEntry2] : Multinodular goiter, Hashimoto's thyroiditis [Other: _____] : [unfilled]

## 2023-05-20 NOTE — ASSESSMENT
[FreeTextEntry1] : Patient with long history of Hashimoto's thyroiditis with hypothyroidism and thyroid nodule.  I do not appreciate any suspicious findings on physical examination today or recent imaging.  I have recommended considering decreasing her dose of thyroid medication in view of suppressed TSH and occasional palpitations.  I have answered her questions to the best my ability.  She should continue yearly follow-ups.

## 2023-05-20 NOTE — PHYSICAL EXAM
[de-identified] : no cervical or supraclavicular adenopathy, trachea midline, thyroid without enlargement or palpable mass [Normal] : no neck adenopathy [de-identified] : Skin:  normal appearance.  no rash, nodules, vesicles, or erythema,\par Musculoskeletal:  full range of motion and no deformities appreciated\par Neurological:  grossly intact\par Psychiatric:  oriented to person, place and time with appropriate affect

## 2023-07-07 ENCOUNTER — APPOINTMENT (OUTPATIENT)
Dept: ENDOCRINOLOGY | Facility: CLINIC | Age: 43
End: 2023-07-07

## 2023-07-27 ENCOUNTER — ASOB RESULT (OUTPATIENT)
Age: 43
End: 2023-07-27

## 2023-07-27 ENCOUNTER — APPOINTMENT (OUTPATIENT)
Dept: OBGYN | Facility: CLINIC | Age: 43
End: 2023-07-27
Payer: COMMERCIAL

## 2023-07-27 VITALS
HEIGHT: 64 IN | WEIGHT: 128 LBS | BODY MASS INDEX: 21.85 KG/M2 | SYSTOLIC BLOOD PRESSURE: 96 MMHG | DIASTOLIC BLOOD PRESSURE: 68 MMHG

## 2023-07-27 DIAGNOSIS — Z01.419 ENCOUNTER FOR GYNECOLOGICAL EXAMINATION (GENERAL) (ROUTINE) W/OUT ABNORMAL FINDINGS: ICD-10-CM

## 2023-07-27 PROCEDURE — 76830 TRANSVAGINAL US NON-OB: CPT

## 2023-07-27 PROCEDURE — 99396 PREV VISIT EST AGE 40-64: CPT

## 2023-07-28 LAB — HPV HIGH+LOW RISK DNA PNL CVX: NOT DETECTED

## 2023-07-31 LAB — CYTOLOGY CVX/VAG DOC THIN PREP: ABNORMAL

## 2023-09-14 ENCOUNTER — APPOINTMENT (OUTPATIENT)
Dept: OBGYN | Facility: CLINIC | Age: 43
End: 2023-09-14
Payer: COMMERCIAL

## 2023-09-14 VITALS
HEIGHT: 64 IN | BODY MASS INDEX: 22.71 KG/M2 | HEART RATE: 97 BPM | SYSTOLIC BLOOD PRESSURE: 115 MMHG | DIASTOLIC BLOOD PRESSURE: 77 MMHG | WEIGHT: 133 LBS

## 2023-09-14 PROCEDURE — 99213 OFFICE O/P EST LOW 20 MIN: CPT

## 2023-10-02 ENCOUNTER — APPOINTMENT (OUTPATIENT)
Dept: SURGERY | Facility: CLINIC | Age: 43
End: 2023-10-02
Payer: COMMERCIAL

## 2023-10-02 VITALS
OXYGEN SATURATION: 98 % | BODY MASS INDEX: 21.34 KG/M2 | HEART RATE: 81 BPM | WEIGHT: 125 LBS | DIASTOLIC BLOOD PRESSURE: 84 MMHG | SYSTOLIC BLOOD PRESSURE: 122 MMHG | RESPIRATION RATE: 17 BRPM | TEMPERATURE: 96.5 F | HEIGHT: 64 IN

## 2023-10-02 DIAGNOSIS — K82.4 CHOLESTEROLOSIS OF GALLBLADDER: ICD-10-CM

## 2023-10-02 PROCEDURE — 99215 OFFICE O/P EST HI 40 MIN: CPT

## 2023-10-02 RX ORDER — LORAZEPAM 0.5 MG/1
0.5 TABLET ORAL
Qty: 10 | Refills: 0 | Status: DISCONTINUED | COMMUNITY
Start: 2022-09-21 | End: 2023-10-02

## 2023-10-28 ENCOUNTER — OUTPATIENT (OUTPATIENT)
Dept: OUTPATIENT SERVICES | Facility: HOSPITAL | Age: 43
LOS: 1 days | End: 2023-10-28
Payer: COMMERCIAL

## 2023-10-28 ENCOUNTER — APPOINTMENT (OUTPATIENT)
Dept: ULTRASOUND IMAGING | Facility: CLINIC | Age: 43
End: 2023-10-28
Payer: COMMERCIAL

## 2023-10-28 DIAGNOSIS — Z00.00 ENCOUNTER FOR GENERAL ADULT MEDICAL EXAMINATION WITHOUT ABNORMAL FINDINGS: ICD-10-CM

## 2023-10-28 PROCEDURE — 76700 US EXAM ABDOM COMPLETE: CPT | Mod: 26

## 2023-10-28 PROCEDURE — 76700 US EXAM ABDOM COMPLETE: CPT

## 2023-11-14 NOTE — PROCEDURE
Has Your Skin Lesion Been Treated?: not been treated Is This A New Presentation, Or A Follow-Up?: Skin Lesions [Cervical Pap Smear] : cervical Pap smear [Liquid Base] : liquid base [Tolerated Well] : the patient tolerated the procedure well [No Complications] : there were no complications

## 2023-11-17 PROBLEM — K82.4 POLYP OF GALLBLADDER: Status: ACTIVE | Noted: 2023-11-17

## 2023-12-21 ENCOUNTER — APPOINTMENT (OUTPATIENT)
Dept: SURGERY | Facility: HOSPITAL | Age: 43
End: 2023-12-21

## 2024-01-04 ENCOUNTER — APPOINTMENT (OUTPATIENT)
Dept: OBGYN | Facility: CLINIC | Age: 44
End: 2024-01-04

## 2024-07-30 ENCOUNTER — APPOINTMENT (OUTPATIENT)
Dept: OBGYN | Facility: CLINIC | Age: 44
End: 2024-07-30
Payer: COMMERCIAL

## 2024-07-30 VITALS
BODY MASS INDEX: 21.34 KG/M2 | HEIGHT: 64 IN | DIASTOLIC BLOOD PRESSURE: 72 MMHG | SYSTOLIC BLOOD PRESSURE: 105 MMHG | WEIGHT: 125 LBS

## 2024-07-30 DIAGNOSIS — R10.2 PELVIC AND PERINEAL PAIN: ICD-10-CM

## 2024-07-30 DIAGNOSIS — Z01.419 ENCOUNTER FOR GYNECOLOGICAL EXAMINATION (GENERAL) (ROUTINE) W/OUT ABNORMAL FINDINGS: ICD-10-CM

## 2024-07-30 DIAGNOSIS — N83.299 OTHER OVARIAN CYST, UNSPECIFIED SIDE: ICD-10-CM

## 2024-07-30 PROCEDURE — 99459 PELVIC EXAMINATION: CPT

## 2024-07-30 PROCEDURE — 99396 PREV VISIT EST AGE 40-64: CPT

## 2024-07-30 NOTE — PHYSICAL EXAM
[Chaperone Present] : A chaperone was present in the examining room during all aspects of the physical examination [02096] : A chaperone was present during the pelvic exam. [Appropriately responsive] : appropriately responsive [Alert] : alert [No Acute Distress] : no acute distress [No Lymphadenopathy] : no lymphadenopathy [Regular Rate Rhythm] : regular rate rhythm [No Murmurs] : no murmurs [Clear to Auscultation B/L] : clear to auscultation bilaterally [Soft] : soft [Non-tender] : non-tender [Non-distended] : non-distended [No HSM] : No HSM [No Lesions] : no lesions [No Mass] : no mass [Oriented x3] : oriented x3 [Labia Majora] : normal [Labia Minora] : normal [Normal] : normal [Uterine Adnexae] : normal

## 2024-07-30 NOTE — DISCUSSION/SUMMARY
[FreeTextEntry1] : A healthy lifestyle can contribute to good health.  This involves maintaining good health habits and avoiding unhealthy activity (e.g. smoking, drugs, etc.)  Patient is counselled on a balanced diet, with Vitamin D supplement as needed.  Any activity is exercise and very important. Walking is encourage and should be brisk. as evidence shows that brisk walking is healthier for both body and brain.    Climbing stairs instead of elevators is good weight bearing exercise.  Dental care both at home and at the dentist office is important.  Rest at night of at least 6 hours is indicated.  The department of healthy lifestyle is available to help in creating good habits as well as dealing with problems  she continues breast cancer care with surgeon and oncologist  Patient is counselled to be up to date on colonoscopy.  For normal exams, every five or ten years is effective in detecting early colon cancer.  If there are positive findings such as polyps more frequent testing is indicated.  She should discuss this with her primary care physician

## 2024-07-30 NOTE — HISTORY OF PRESENT ILLNESS
[FreeTextEntry1] : The patient is here for a routine gynecological examination with Pap smear.  Her LMP was over a year from medication for breast cancer   She has no recent gynecological or urinary problems

## 2024-07-31 LAB — HPV HIGH+LOW RISK DNA PNL CVX: NOT DETECTED

## 2024-08-04 LAB — CYTOLOGY CVX/VAG DOC THIN PREP: ABNORMAL

## 2024-11-20 ENCOUNTER — NON-APPOINTMENT (OUTPATIENT)
Age: 44
End: 2024-11-20

## 2025-03-05 NOTE — HEALTH RISK ASSESSMENT
54 55 34 [Yes] : Yes 35 [No falls in past year] : Patient reported no falls in the past year 60 [0] : 2) Feeling down, depressed, or hopeless: Not at all (0) 55 [Very Good] : ~his/her~  mood as very good [Patient reported mammogram was normal] : Patient reported mammogram was normal [HIV Test offered] : HIV Test offered [Hepatitis C test offered] : Hepatitis C test offered [With Significant Other] : lives with significant other [Employed] : employed 50 [] :  [# Of Children ___] : has [unfilled] children [Feels Safe at Home] : Feels safe at home [Fully functional (bathing, dressing, toileting, transferring, walking, feeding)] : Fully functional (bathing, dressing, toileting, transferring, walking, feeding) [Fully functional (using the telephone, shopping, preparing meals, housekeeping, doing laundry, using] : Fully functional and needs no help or supervision to perform IADLs (using the telephone, shopping, preparing meals, housekeeping, doing laundry, using transportation, managing medications and managing finances) [Smoke Detector] : smoke detector [] : No [[id number]] : 0 [Seat Belt] : does not use seat belt [MammogramDate] : 2/2020 [MammogramComments] : breast cancer 2018 - lumpectomy [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

## 2025-04-18 ENCOUNTER — APPOINTMENT (OUTPATIENT)
Dept: OBGYN | Facility: CLINIC | Age: 45
End: 2025-04-18
Payer: COMMERCIAL

## 2025-04-18 VITALS
BODY MASS INDEX: 21.34 KG/M2 | DIASTOLIC BLOOD PRESSURE: 87 MMHG | WEIGHT: 125 LBS | SYSTOLIC BLOOD PRESSURE: 129 MMHG | HEIGHT: 64 IN

## 2025-04-18 DIAGNOSIS — R10.2 PELVIC AND PERINEAL PAIN: ICD-10-CM

## 2025-04-18 DIAGNOSIS — R31.29 OTHER MICROSCOPIC HEMATURIA: ICD-10-CM

## 2025-04-18 PROCEDURE — 99214 OFFICE O/P EST MOD 30 MIN: CPT

## 2025-04-18 PROCEDURE — 99459 PELVIC EXAMINATION: CPT | Mod: NC

## 2025-04-20 LAB — BACTERIA UR CULT: NORMAL

## 2025-04-24 LAB — CYTOLOGY CVX/VAG DOC THIN PREP: ABNORMAL

## 2025-04-28 ENCOUNTER — APPOINTMENT (OUTPATIENT)
Dept: OBGYN | Facility: CLINIC | Age: 45
End: 2025-04-28
Payer: COMMERCIAL

## 2025-04-28 ENCOUNTER — ASOB RESULT (OUTPATIENT)
Age: 45
End: 2025-04-28

## 2025-04-28 VITALS
HEIGHT: 64 IN | SYSTOLIC BLOOD PRESSURE: 122 MMHG | DIASTOLIC BLOOD PRESSURE: 79 MMHG | WEIGHT: 127 LBS | BODY MASS INDEX: 21.68 KG/M2

## 2025-04-28 DIAGNOSIS — R10.2 PELVIC AND PERINEAL PAIN: ICD-10-CM

## 2025-04-28 PROCEDURE — 99212 OFFICE O/P EST SF 10 MIN: CPT

## 2025-04-28 PROCEDURE — 76830 TRANSVAGINAL US NON-OB: CPT

## 2025-04-28 PROCEDURE — 99459 PELVIC EXAMINATION: CPT | Mod: NC

## 2025-04-29 LAB
C TRACH RRNA SPEC QL NAA+PROBE: NOT DETECTED
N GONORRHOEA RRNA SPEC QL NAA+PROBE: NOT DETECTED
SOURCE AMPLIFICATION: NORMAL

## 2025-05-05 LAB
BACTERIA UR CULT: NORMAL
CANDIDA VAG CYTO: NOT DETECTED
G VAGINALIS+PREV SP MTYP VAG QL MICRO: NOT DETECTED
T VAGINALIS VAG QL WET PREP: NOT DETECTED

## 2025-05-06 ENCOUNTER — TRANSCRIPTION ENCOUNTER (OUTPATIENT)
Age: 45
End: 2025-05-06

## 2025-07-18 ENCOUNTER — APPOINTMENT (OUTPATIENT)
Dept: ORTHOPEDIC SURGERY | Facility: CLINIC | Age: 45
End: 2025-07-18
Payer: COMMERCIAL

## 2025-07-18 VITALS — WEIGHT: 127 LBS | BODY MASS INDEX: 21.68 KG/M2 | HEIGHT: 64 IN

## 2025-07-18 PROCEDURE — 73030 X-RAY EXAM OF SHOULDER: CPT | Mod: LT

## 2025-07-18 PROCEDURE — 99213 OFFICE O/P EST LOW 20 MIN: CPT

## 2025-07-18 PROCEDURE — 73010 X-RAY EXAM OF SHOULDER BLADE: CPT | Mod: LT

## 2025-07-18 RX ORDER — METHYLPREDNISOLONE 4 MG/1
4 TABLET ORAL
Qty: 1 | Refills: 0 | Status: ACTIVE | COMMUNITY
Start: 2025-07-18 | End: 1900-01-01

## 2025-07-23 ENCOUNTER — APPOINTMENT (OUTPATIENT)
Dept: ORTHOPEDIC SURGERY | Facility: CLINIC | Age: 45
End: 2025-07-23
Payer: COMMERCIAL

## 2025-07-23 VITALS — WEIGHT: 127 LBS | BODY MASS INDEX: 21.68 KG/M2 | HEIGHT: 64 IN

## 2025-07-23 DIAGNOSIS — M75.32 CALCIFIC TENDINITIS OF LEFT SHOULDER: ICD-10-CM

## 2025-07-23 PROCEDURE — 99213 OFFICE O/P EST LOW 20 MIN: CPT | Mod: 25

## 2025-07-23 PROCEDURE — 73030 X-RAY EXAM OF SHOULDER: CPT | Mod: LT

## 2025-07-23 PROCEDURE — 20611 DRAIN/INJ JOINT/BURSA W/US: CPT | Mod: LT

## 2025-08-15 ENCOUNTER — APPOINTMENT (OUTPATIENT)
Dept: ORTHOPEDIC SURGERY | Facility: CLINIC | Age: 45
End: 2025-08-15